# Patient Record
Sex: FEMALE | Race: WHITE | NOT HISPANIC OR LATINO | URBAN - NONMETROPOLITAN AREA
[De-identification: names, ages, dates, MRNs, and addresses within clinical notes are randomized per-mention and may not be internally consistent; named-entity substitution may affect disease eponyms.]

---

## 2017-01-20 ENCOUNTER — OFFICE (OUTPATIENT)
Dept: URBAN - NONMETROPOLITAN AREA CLINIC 13 | Facility: CLINIC | Age: 57
End: 2017-01-20
Payer: COMMERCIAL

## 2017-01-20 VITALS — WEIGHT: 187 LBS | HEIGHT: 67 IN

## 2017-01-20 VITALS — HEIGHT: 67 IN

## 2017-01-20 DIAGNOSIS — Z79.899 OTHER LONG TERM (CURRENT) DRUG THERAPY: ICD-10-CM

## 2017-01-20 DIAGNOSIS — D50.9 IRON DEFICIENCY ANEMIA, UNSPECIFIED: ICD-10-CM

## 2017-01-20 DIAGNOSIS — K21.9 GASTRO-ESOPHAGEAL REFLUX DISEASE WITHOUT ESOPHAGITIS: ICD-10-CM

## 2017-01-20 LAB
COMPLETE METABOLIC: ALBUMIN: 4.4 G/DL (ref 3.5–5.2)
COMPLETE METABOLIC: ALK. PHOS: 113 U/L (ref 40–150)
COMPLETE METABOLIC: ALT: 73 U/L — HIGH (ref 0–55)
COMPLETE METABOLIC: AST: 89 U/L — HIGH (ref 5–34)
COMPLETE METABOLIC: BUN: 8 MG/DL (ref 7–26)
COMPLETE METABOLIC: CALCIUM: 9.4 MG/DL (ref 8.4–10.2)
COMPLETE METABOLIC: CHLORIDE: 106 MMOL/L (ref 98–107)
COMPLETE METABOLIC: CO2: 25 MEQ/L (ref 22–29)
COMPLETE METABOLIC: CREATININE: 0.7 MG/DL (ref 0.6–1.3)
COMPLETE METABOLIC: GLUCOSE: 84 MG/DL (ref 70–99)
COMPLETE METABOLIC: POTASSIUM: 4.3 MMOL/L (ref 3.5–5.3)
COMPLETE METABOLIC: SODIUM: 141 MMOL/L (ref 136–145)
COMPLETE METABOLIC: TOTAL BILIRUBIN: 0.7 MG/DL (ref 0.2–1.2)
COMPLETE METABOLIC: TOTAL PROTEIN: 7.4 G/DL (ref 6.4–8.3)

## 2017-01-20 PROCEDURE — 80053 COMPREHEN METABOLIC PANEL: CPT

## 2017-01-20 PROCEDURE — 82728 ASSAY OF FERRITIN: CPT

## 2017-01-20 PROCEDURE — 36415 COLL VENOUS BLD VENIPUNCTURE: CPT

## 2017-01-20 PROCEDURE — 99203 OFFICE O/P NEW LOW 30 MIN: CPT

## 2017-02-06 ENCOUNTER — AMBULATORY SURGICAL CENTER (OUTPATIENT)
Dept: URBAN - NONMETROPOLITAN AREA SURGERY 2 | Facility: SURGERY | Age: 57
End: 2017-02-06
Payer: COMMERCIAL

## 2017-02-06 VITALS
OXYGEN SATURATION: 94 % | SYSTOLIC BLOOD PRESSURE: 136 MMHG | RESPIRATION RATE: 22 BRPM | WEIGHT: 187 LBS | SYSTOLIC BLOOD PRESSURE: 120 MMHG | OXYGEN SATURATION: 98 % | RESPIRATION RATE: 20 BRPM | SYSTOLIC BLOOD PRESSURE: 109 MMHG | HEART RATE: 82 BPM | DIASTOLIC BLOOD PRESSURE: 77 MMHG | HEART RATE: 73 BPM | SYSTOLIC BLOOD PRESSURE: 158 MMHG | OXYGEN SATURATION: 95 % | DIASTOLIC BLOOD PRESSURE: 47 MMHG | HEART RATE: 93 BPM | OXYGEN SATURATION: 96 % | HEART RATE: 85 BPM | SYSTOLIC BLOOD PRESSURE: 130 MMHG | HEART RATE: 94 BPM | SYSTOLIC BLOOD PRESSURE: 131 MMHG | DIASTOLIC BLOOD PRESSURE: 70 MMHG | HEIGHT: 67 IN | HEART RATE: 84 BPM | RESPIRATION RATE: 18 BRPM | SYSTOLIC BLOOD PRESSURE: 142 MMHG | DIASTOLIC BLOOD PRESSURE: 74 MMHG | OXYGEN SATURATION: 87 % | OXYGEN SATURATION: 97 % | DIASTOLIC BLOOD PRESSURE: 97 MMHG | HEART RATE: 83 BPM | DIASTOLIC BLOOD PRESSURE: 72 MMHG

## 2017-02-06 DIAGNOSIS — Z12.11 ENCOUNTER FOR SCREENING FOR MALIGNANT NEOPLASM OF COLON: ICD-10-CM

## 2017-02-06 PROCEDURE — G0121 COLON CA SCRN NOT HI RSK IND: HCPCS | Performed by: INTERNAL MEDICINE

## 2017-02-06 RX ORDER — FERROUS SULFATE TAB 325 MG (65 MG ELEMENTAL FE) 325 (65 FE) MG
TAB ORAL
Qty: 90 | Refills: 5 | Status: ACTIVE
Start: 2017-02-06

## 2017-02-06 RX ORDER — VITAMIN E 268 MG
CAPSULE ORAL
Qty: 60 | Refills: 5 | Status: ACTIVE
Start: 2017-02-06

## 2017-02-22 ENCOUNTER — OFFICE (OUTPATIENT)
Dept: URBAN - NONMETROPOLITAN AREA CLINIC 13 | Facility: CLINIC | Age: 57
End: 2017-02-22
Payer: COMMERCIAL

## 2017-02-22 ENCOUNTER — AMBULATORY SURGICAL CENTER (OUTPATIENT)
Dept: URBAN - NONMETROPOLITAN AREA SURGERY 2 | Facility: SURGERY | Age: 57
End: 2017-02-22
Payer: COMMERCIAL

## 2017-02-22 VITALS
SYSTOLIC BLOOD PRESSURE: 94 MMHG | OXYGEN SATURATION: 94 % | HEART RATE: 83 BPM | SYSTOLIC BLOOD PRESSURE: 103 MMHG | HEIGHT: 67 IN | DIASTOLIC BLOOD PRESSURE: 67 MMHG | HEART RATE: 78 BPM | HEART RATE: 93 BPM | DIASTOLIC BLOOD PRESSURE: 76 MMHG | SYSTOLIC BLOOD PRESSURE: 112 MMHG | OXYGEN SATURATION: 97 % | HEART RATE: 80 BPM | DIASTOLIC BLOOD PRESSURE: 71 MMHG | SYSTOLIC BLOOD PRESSURE: 108 MMHG | WEIGHT: 187 LBS | SYSTOLIC BLOOD PRESSURE: 113 MMHG | SYSTOLIC BLOOD PRESSURE: 126 MMHG | SYSTOLIC BLOOD PRESSURE: 123 MMHG | OXYGEN SATURATION: 96 % | DIASTOLIC BLOOD PRESSURE: 60 MMHG | RESPIRATION RATE: 18 BRPM | DIASTOLIC BLOOD PRESSURE: 51 MMHG | OXYGEN SATURATION: 98 % | OXYGEN SATURATION: 93 % | HEART RATE: 81 BPM | HEART RATE: 89 BPM | RESPIRATION RATE: 20 BRPM | HEART RATE: 87 BPM | OXYGEN SATURATION: 92 % | SYSTOLIC BLOOD PRESSURE: 111 MMHG | HEART RATE: 82 BPM | DIASTOLIC BLOOD PRESSURE: 52 MMHG | SYSTOLIC BLOOD PRESSURE: 117 MMHG | SYSTOLIC BLOOD PRESSURE: 124 MMHG | DIASTOLIC BLOOD PRESSURE: 74 MMHG | HEART RATE: 86 BPM | SYSTOLIC BLOOD PRESSURE: 145 MMHG | HEART RATE: 84 BPM | DIASTOLIC BLOOD PRESSURE: 58 MMHG

## 2017-02-22 VITALS — HEIGHT: 67 IN

## 2017-02-22 DIAGNOSIS — R74.0 NONSPECIFIC ELEVATION OF LEVELS OF TRANSAMINASE AND LACTIC A: ICD-10-CM

## 2017-02-22 DIAGNOSIS — R94.5 ABNORMAL RESULTS OF LIVER FUNCTION STUDIES: ICD-10-CM

## 2017-02-22 DIAGNOSIS — Z79.01 LONG TERM (CURRENT) USE OF ANTICOAGULANTS: ICD-10-CM

## 2017-02-22 PROBLEM — R16.1 SPLENOMEGALY: Status: ACTIVE | Noted: 2017-02-22

## 2017-02-22 PROCEDURE — 76705 ECHO EXAM OF ABDOMEN: CPT

## 2017-02-22 PROCEDURE — 47000 NEEDLE BIOPSY OF LIVER PERQ: CPT | Performed by: INTERNAL MEDICINE

## 2017-02-22 PROCEDURE — 85610 PROTHROMBIN TIME: CPT

## 2017-02-22 PROCEDURE — 00702 ANES UPR ANT ABD WALL LVR BX: CPT | Mod: QS,QZ

## 2017-02-27 ENCOUNTER — OFFICE (OUTPATIENT)
Dept: URBAN - NONMETROPOLITAN AREA CLINIC 13 | Facility: CLINIC | Age: 57
End: 2017-02-27
Payer: COMMERCIAL

## 2017-02-27 VITALS — HEIGHT: 67 IN

## 2017-02-27 VITALS
HEIGHT: 67 IN | RESPIRATION RATE: 18 BRPM | DIASTOLIC BLOOD PRESSURE: 74 MMHG | SYSTOLIC BLOOD PRESSURE: 145 MMHG | WEIGHT: 181 LBS | HEART RATE: 101 BPM

## 2017-02-27 DIAGNOSIS — K21.9 GASTRO-ESOPHAGEAL REFLUX DISEASE WITHOUT ESOPHAGITIS: ICD-10-CM

## 2017-02-27 DIAGNOSIS — R94.5 ABNORMAL RESULTS OF LIVER FUNCTION STUDIES: ICD-10-CM

## 2017-02-27 DIAGNOSIS — D50.9 IRON DEFICIENCY ANEMIA, UNSPECIFIED: ICD-10-CM

## 2017-02-27 DIAGNOSIS — K76.0 FATTY (CHANGE OF) LIVER, NOT ELSEWHERE CLASSIFIED: ICD-10-CM

## 2017-02-27 DIAGNOSIS — R19.7 DIARRHEA, UNSPECIFIED: ICD-10-CM

## 2017-02-27 LAB
COMPLETE METABOLIC: ALBUMIN: 4.7 G/DL (ref 3.5–5.2)
COMPLETE METABOLIC: ALK. PHOS: 97 U/L (ref 40–150)
COMPLETE METABOLIC: ALT: 52 U/L (ref 0–55)
COMPLETE METABOLIC: AST: 56 U/L — HIGH (ref 5–34)
COMPLETE METABOLIC: BUN: 10 MG/DL (ref 7–26)
COMPLETE METABOLIC: CALCIUM: 9.7 MG/DL (ref 8.4–10.2)
COMPLETE METABOLIC: CHLORIDE: 107 MMOL/L (ref 98–107)
COMPLETE METABOLIC: CO2: 22 MEQ/L (ref 22–29)
COMPLETE METABOLIC: CREATININE: 0.8 MG/DL (ref 0.6–1.3)
COMPLETE METABOLIC: GLUCOSE: 93 MG/DL (ref 70–99)
COMPLETE METABOLIC: POTASSIUM: 4.3 MMOL/L (ref 3.5–5.3)
COMPLETE METABOLIC: SODIUM: 141 MMOL/L (ref 136–145)
COMPLETE METABOLIC: TOTAL BILIRUBIN: 0.6 MG/DL (ref 0.2–1.2)
COMPLETE METABOLIC: TOTAL PROTEIN: 7.7 G/DL (ref 6.4–8.3)
FERRITIN: 14.8 NG/ML (ref 6.2–188)

## 2017-02-27 PROCEDURE — 82728 ASSAY OF FERRITIN: CPT

## 2017-02-27 PROCEDURE — 99214 OFFICE O/P EST MOD 30 MIN: CPT

## 2017-02-27 PROCEDURE — 80053 COMPREHEN METABOLIC PANEL: CPT

## 2017-03-03 ENCOUNTER — OFFICE (OUTPATIENT)
Dept: URBAN - NONMETROPOLITAN AREA CLINIC 13 | Facility: CLINIC | Age: 57
End: 2017-03-03

## 2017-03-03 VITALS — HEIGHT: 67 IN

## 2017-03-03 DIAGNOSIS — R19.7 DIARRHEA, UNSPECIFIED: ICD-10-CM

## 2017-03-03 PROCEDURE — 82274 ASSAY TEST FOR BLOOD FECAL: CPT

## 2017-03-17 ENCOUNTER — OFFICE (OUTPATIENT)
Dept: URBAN - NONMETROPOLITAN AREA CLINIC 13 | Facility: CLINIC | Age: 57
End: 2017-03-17

## 2017-03-17 ENCOUNTER — AMBULATORY SURGICAL CENTER (OUTPATIENT)
Dept: URBAN - NONMETROPOLITAN AREA SURGERY 2 | Facility: SURGERY | Age: 57
End: 2017-03-17

## 2017-03-17 VITALS
DIASTOLIC BLOOD PRESSURE: 67 MMHG | RESPIRATION RATE: 20 BRPM | DIASTOLIC BLOOD PRESSURE: 56 MMHG | OXYGEN SATURATION: 99 % | SYSTOLIC BLOOD PRESSURE: 122 MMHG | DIASTOLIC BLOOD PRESSURE: 78 MMHG | SYSTOLIC BLOOD PRESSURE: 130 MMHG | DIASTOLIC BLOOD PRESSURE: 69 MMHG | WEIGHT: 182 LBS | HEIGHT: 67 IN | HEART RATE: 89 BPM | HEART RATE: 101 BPM | SYSTOLIC BLOOD PRESSURE: 113 MMHG | HEART RATE: 81 BPM | HEART RATE: 92 BPM | HEART RATE: 95 BPM | OXYGEN SATURATION: 97 % | SYSTOLIC BLOOD PRESSURE: 108 MMHG | OXYGEN SATURATION: 96 %

## 2017-03-17 VITALS
HEART RATE: 95 BPM | DIASTOLIC BLOOD PRESSURE: 73 MMHG | HEIGHT: 67 IN | SYSTOLIC BLOOD PRESSURE: 147 MMHG | WEIGHT: 182 LBS

## 2017-03-17 VITALS — HEIGHT: 67 IN

## 2017-03-17 DIAGNOSIS — K21.9 GASTRO-ESOPHAGEAL REFLUX DISEASE WITHOUT ESOPHAGITIS: ICD-10-CM

## 2017-03-17 DIAGNOSIS — R14.2 ERUCTATION: ICD-10-CM

## 2017-03-17 DIAGNOSIS — K76.0 FATTY (CHANGE OF) LIVER, NOT ELSEWHERE CLASSIFIED: ICD-10-CM

## 2017-03-17 DIAGNOSIS — K30 FUNCTIONAL DYSPEPSIA: ICD-10-CM

## 2017-03-17 DIAGNOSIS — D50.9 IRON DEFICIENCY ANEMIA, UNSPECIFIED: ICD-10-CM

## 2017-03-17 DIAGNOSIS — K44.9 DIAPHRAGMATIC HERNIA WITHOUT OBSTRUCTION OR GANGRENE: ICD-10-CM

## 2017-03-17 DIAGNOSIS — R19.7 DIARRHEA, UNSPECIFIED: ICD-10-CM

## 2017-03-17 DIAGNOSIS — K31.89 OTHER DISEASES OF STOMACH AND DUODENUM: ICD-10-CM

## 2017-03-17 DIAGNOSIS — K31.9 DISEASE OF STOMACH AND DUODENUM, UNSPECIFIED: ICD-10-CM

## 2017-03-17 PROBLEM — R19.5 OCCULT BLOOD IN STOOLS: Status: ACTIVE | Noted: 2017-03-17

## 2017-03-17 PROBLEM — R10.13 DYSPEPSIA: Status: ACTIVE | Noted: 2017-03-17

## 2017-03-17 LAB
CBC EMERALD: HEMATOCRIT: 35 % — LOW (ref 37–47)
CBC EMERALD: HEMOGLOBIN: 11.4 G/DL — LOW (ref 12–14)
CBC EMERALD: LYMPHS %: 29.3 % (ref 20.5–40.5)
CBC EMERALD: LYMPHS ABSOLUTE: 2.5 10*3U/L (ref 1.3–2.9)
CBC EMERALD: MCH: 28.1 PG (ref 27–32)
CBC EMERALD: MCHC: 32.6 G/DL (ref 32–35)
CBC EMERALD: MCV: 86.2 FL (ref 84–100)
CBC EMERALD: MONOS %: 7.8 % (ref 2–10)
CBC EMERALD: MONOS ABSOLUTE: 0.7 10*3U/L (ref 0.3–3.8)
CBC EMERALD: MPV: 10.8 FL — HIGH (ref 7.4–10.4)
CBC EMERALD: NEUT. %: 62.9 % (ref 43–65)
CBC EMERALD: NEUT. ABSOLUTE: 5.5 10*3U/L — HIGH (ref 2.2–4.8)
CBC EMERALD: PLATELETS: 137 10*3U/L (ref 130–440)
CBC EMERALD: RBC: 4.1 10*6U/L — LOW (ref 4.2–5.4)
CBC EMERALD: RDW: 17.5 % — HIGH (ref 11.5–15.5)
CBC EMERALD: WBC: 8.7 10*3U/L (ref 4.5–10.5)
COMPLETE METABOLIC: ALBUMIN: 4.5 G/DL (ref 3.5–5.2)
COMPLETE METABOLIC: ALK. PHOS: 108 U/L (ref 40–150)
COMPLETE METABOLIC: ALT: 59 U/L — HIGH (ref 0–55)
COMPLETE METABOLIC: AST: 88 U/L — HIGH (ref 5–34)
COMPLETE METABOLIC: BUN: 12 MG/DL (ref 7–26)
COMPLETE METABOLIC: CALCIUM: 9.4 MG/DL (ref 8.4–10.2)
COMPLETE METABOLIC: CHLORIDE: 108 MMOL/L — HIGH (ref 98–107)
COMPLETE METABOLIC: CO2: 20 MEQ/L — LOW (ref 22–29)
COMPLETE METABOLIC: CREATININE: 0.7 MG/DL (ref 0.6–1.3)
COMPLETE METABOLIC: GLUCOSE: 104 MG/DL — HIGH (ref 70–99)
COMPLETE METABOLIC: POTASSIUM: 4.4 MMOL/L (ref 3.5–5.3)
COMPLETE METABOLIC: SODIUM: 142 MMOL/L (ref 136–145)
COMPLETE METABOLIC: TOTAL BILIRUBIN: 0.7 MG/DL (ref 0.2–1.2)
COMPLETE METABOLIC: TOTAL PROTEIN: 7.5 G/DL (ref 6.4–8.3)

## 2017-03-17 PROCEDURE — 00740: CPT | Mod: QS,QZ | Performed by: REGISTERED NURSE

## 2017-03-17 PROCEDURE — 80053 COMPREHEN METABOLIC PANEL: CPT | Performed by: INTERNAL MEDICINE

## 2017-03-17 PROCEDURE — 99213 OFFICE O/P EST LOW 20 MIN: CPT

## 2017-03-17 PROCEDURE — 82728 ASSAY OF FERRITIN: CPT | Performed by: INTERNAL MEDICINE

## 2017-03-17 PROCEDURE — 36415 COLL VENOUS BLD VENIPUNCTURE: CPT | Performed by: INTERNAL MEDICINE

## 2017-03-17 PROCEDURE — 88312 SPECIAL STAINS GROUP 1: CPT | Mod: TC | Performed by: INTERNAL MEDICINE

## 2017-03-17 PROCEDURE — 88305 TISSUE EXAM BY PATHOLOGIST: CPT | Mod: TC | Performed by: INTERNAL MEDICINE

## 2017-03-17 PROCEDURE — 85025 COMPLETE CBC W/AUTO DIFF WBC: CPT | Performed by: INTERNAL MEDICINE

## 2017-03-17 PROCEDURE — 43239 EGD BIOPSY SINGLE/MULTIPLE: CPT | Performed by: INTERNAL MEDICINE

## 2017-03-17 RX ORDER — ESOMEPRAZOLE MAGNESIUM 40 MG/1
CAPSULE, DELAYED RELEASE ORAL
Qty: 90 | Refills: 1 | Status: COMPLETED
Start: 2017-03-17 | End: 2017-10-11

## 2017-03-17 RX ORDER — RANITIDINE HYDROCHLORIDE 300 MG/1
TABLET, FILM COATED ORAL
Qty: 90 | Refills: 1 | Status: COMPLETED
Start: 2017-03-17 | End: 2017-10-11

## 2017-03-17 RX ORDER — LANSOPRAZOLE 30 MG/1
CAPSULE, DELAYED RELEASE PELLETS ORAL
Refills: 0 | Status: COMPLETED
End: 2017-03-17 | Stop reason: ALTCHOICE

## 2017-03-21 LAB — SURGICAL PROCEDURE: PDF REPORT: (no result)

## 2017-10-11 ENCOUNTER — OFFICE (OUTPATIENT)
Dept: URBAN - NONMETROPOLITAN AREA CLINIC 13 | Facility: CLINIC | Age: 57
End: 2017-10-11
Payer: COMMERCIAL

## 2017-10-11 VITALS
WEIGHT: 153 LBS | SYSTOLIC BLOOD PRESSURE: 170 MMHG | DIASTOLIC BLOOD PRESSURE: 68 MMHG | HEART RATE: 76 BPM | HEIGHT: 67 IN

## 2017-10-11 VITALS — HEIGHT: 67 IN

## 2017-10-11 DIAGNOSIS — K62.5 HEMORRHAGE OF ANUS AND RECTUM: ICD-10-CM

## 2017-10-11 DIAGNOSIS — R19.00 INTRA-ABDOMINAL AND PELVIC SWELLING, MASS AND LUMP, UNSPECIF: ICD-10-CM

## 2017-10-11 DIAGNOSIS — K21.9 GASTRO-ESOPHAGEAL REFLUX DISEASE WITHOUT ESOPHAGITIS: ICD-10-CM

## 2017-10-11 DIAGNOSIS — K74.60 UNSPECIFIED CIRRHOSIS OF LIVER: ICD-10-CM

## 2017-10-11 DIAGNOSIS — D50.9 IRON DEFICIENCY ANEMIA, UNSPECIFIED: ICD-10-CM

## 2017-10-11 PROCEDURE — 99213 OFFICE O/P EST LOW 20 MIN: CPT | Performed by: NURSE PRACTITIONER

## 2017-10-11 PROCEDURE — 85025 COMPLETE CBC W/AUTO DIFF WBC: CPT

## 2020-06-18 NOTE — SERVICENOTES
PT/INR Duration Of Freeze Thaw-Cycle (Seconds): 5 Detail Level: Simple Number Of Freeze-Thaw Cycles: 1 freeze-thaw cycle Render Post-Care Instructions In Note?: no